# Patient Record
Sex: FEMALE | Race: WHITE | NOT HISPANIC OR LATINO | URBAN - METROPOLITAN AREA
[De-identification: names, ages, dates, MRNs, and addresses within clinical notes are randomized per-mention and may not be internally consistent; named-entity substitution may affect disease eponyms.]

---

## 2023-08-08 ENCOUNTER — HOSPITAL ENCOUNTER (OUTPATIENT)
Dept: DATA CONVERSION | Facility: HOSPITAL | Age: 43
Discharge: HOME | End: 2023-08-08

## 2023-08-08 DIAGNOSIS — R92.8 OTHER ABNORMAL AND INCONCLUSIVE FINDINGS ON DIAGNOSTIC IMAGING OF BREAST: ICD-10-CM

## 2024-01-24 PROCEDURE — 87624 HPV HI-RISK TYP POOLED RSLT: CPT

## 2024-01-24 PROCEDURE — 88175 CYTOPATH C/V AUTO FLUID REDO: CPT

## 2024-01-26 ENCOUNTER — LAB REQUISITION (OUTPATIENT)
Dept: LAB | Facility: HOSPITAL | Age: 44
End: 2024-01-26
Payer: COMMERCIAL

## 2024-01-26 DIAGNOSIS — Z11.51 ENCOUNTER FOR SCREENING FOR HUMAN PAPILLOMAVIRUS (HPV): ICD-10-CM

## 2024-01-26 DIAGNOSIS — Z01.419 ENCOUNTER FOR GYNECOLOGICAL EXAMINATION (GENERAL) (ROUTINE) WITHOUT ABNORMAL FINDINGS: ICD-10-CM

## 2024-02-08 ENCOUNTER — HOSPITAL ENCOUNTER (OUTPATIENT)
Dept: RADIOLOGY | Facility: CLINIC | Age: 44
Discharge: HOME | End: 2024-02-08
Payer: COMMERCIAL

## 2024-02-08 VITALS — BODY MASS INDEX: 20.49 KG/M2 | WEIGHT: 120 LBS | HEIGHT: 64 IN

## 2024-02-08 DIAGNOSIS — Z12.31 ENCOUNTER FOR SCREENING MAMMOGRAM FOR MALIGNANT NEOPLASM OF BREAST: ICD-10-CM

## 2024-02-08 PROCEDURE — 77067 SCR MAMMO BI INCL CAD: CPT

## 2024-07-17 ENCOUNTER — HOSPITAL ENCOUNTER (OUTPATIENT)
Dept: RADIOLOGY | Facility: CLINIC | Age: 44
Discharge: HOME | End: 2024-07-17
Payer: COMMERCIAL

## 2024-07-17 DIAGNOSIS — Z91.89 OTHER SPECIFIED PERSONAL RISK FACTORS, NOT ELSEWHERE CLASSIFIED: ICD-10-CM

## 2024-07-17 PROCEDURE — A9575 INJ GADOTERATE MEGLUMI 0.1ML: HCPCS | Performed by: STUDENT IN AN ORGANIZED HEALTH CARE EDUCATION/TRAINING PROGRAM

## 2024-07-17 PROCEDURE — 77049 MRI BREAST C-+ W/CAD BI: CPT | Performed by: RADIOLOGY

## 2024-07-17 PROCEDURE — 77049 MRI BREAST C-+ W/CAD BI: CPT

## 2024-07-17 PROCEDURE — 2550000001 HC RX 255 CONTRASTS: Performed by: STUDENT IN AN ORGANIZED HEALTH CARE EDUCATION/TRAINING PROGRAM

## 2024-07-17 RX ORDER — GADOTERATE MEGLUMINE 376.9 MG/ML
11 INJECTION INTRAVENOUS
Status: COMPLETED | OUTPATIENT
Start: 2024-07-17 | End: 2024-07-17

## 2024-07-19 PROBLEM — R92.8 ABNORMAL FINDING ON BREAST IMAGING: Status: ACTIVE | Noted: 2024-07-19

## 2024-07-19 NOTE — PROGRESS NOTES
[facility]  Arlene Bull female   1980 43 y.o.  [id number]      Chief Complaint  New patient, biopsy consultation.    History Of Present Illness  Arlene Bull is a pleasant 43 y.o.  female seen in the breast center for biopsy consultation. She denies breast surgery or biopsy. She has family history of breast cancer, see below.    BREAST IMAGIN2024 Full breast MRI, indicates BI-RADS Category 4. Suspicious multifocal left breast masses segmentally distributed in the superolateral quadrant. Recommend MRI directed ultrasound and biopsy. An ultrasound correlate is likely for the dominant mass. If the additional smaller masses are seen sonographically, they should be considered for ultrasound-guided biopsy as well to better characterize the extent of the abnormality. However if these additional masses are not seen, depending on the pathology results, additional biopsies under MRI guidance may be necessary.      Suspicious right breast subareolar non mass enhancement. Recommend MRI directed ultrasound and biopsy. If no correlate is found on ultrasound, recommend MRI guided biopsy.      Indeterminate right breast non-mass enhancement at posterior depth. Management will be determined based off of biopsy results of the previously described left breast masses and right breast non mass enhancement.      REPRODUCTIVE HISTORY: menarche age 14, , first birth age 29, did not breastfeed, no OCP's, premenopausal, LMP 1024, heterogeneously dense                                FAMILY CANCER HISTORY:   Mother: Breast cancer, 50's (s/p bilateral mastectomy)  Maternal Grandmother: Breast cancer, 50's (s/p bilateral mastectomy)      Review of Systems  Constitutional:  Negative for appetite change, fatigue, fever and unexpected weight change.   HENT:  Negative for ear pain, hearing loss, nosebleeds, sore throat and trouble swallowing.    Eyes:  Negative for discharge, itching and visual disturbance.    Breast: As stated in HPI.  Respiratory:  Negative for cough, chest tightness and shortness of breath.    Cardiovascular:  Negative for chest pain, palpitations and leg swelling.   Gastrointestinal:  Negative for abdominal pain, constipation, diarrhea and nausea.   Endocrine: Negative for cold intolerance and heat intolerance.   Genitourinary:  Negative for dysuria, frequency, hematuria, pelvic pain and vaginal bleeding.   Musculoskeletal:  Negative for arthralgias, back pain, gait problem, joint swelling and myalgias.   Skin:  Negative for color change and rash.   Allergic/Immunologic: Negative for environmental allergies and food allergies.   Neurological:  Negative for dizziness, tremors, speech difficulty, weakness, numbness and headaches.   Hematological:  Does not bruise/bleed easily.   Psychiatric/Behavioral:  Negative for agitation, dysphoric mood and sleep disturbance. The patient is not nervous/anxious.       Past Medical History  She has a past medical history of Pain in unspecified finger(s) (10/08/2014) and Unspecified sprain of unspecified thumb, initial encounter (10/08/2014).    Surgical History  She has no past surgical history on file.    Family History  Cancer-related family history includes Breast cancer (age of onset: 50) in her maternal grandmother and mother.     Social History  She reports that she has never smoked. She has never been exposed to tobacco smoke. She has never used smokeless tobacco. She reports current alcohol use of about 3.0 standard drinks of alcohol per week. She reports that she does not use drugs.    Allergies  Patient has no known allergies.    Medications  No current outpatient medications    Last Recorded Vitals  Vitals:    07/29/24 1025   BP: 125/80   Pulse: 88   Resp: 16   Temp: 36.6 °C (97.9 °F)       Physical Exam  Patient is alert and oriented x3 and in a relaxed and appropriate mood. Her gait is steady and hand grasps are equal. Sclera is clear. The breasts are  nearly symmetrical. The tissue is dense throughout especially superior lateral quadrants without palpable abnormalities, discrete nodules or masses. The skin and nipples appear normal. There is no cervical, supraclavicular or axillary lymphadenopathy. Heart rate and rhythm normal, S1 and S2 appreciated. The lungs are clear to auscultation bilaterally. Abdomen is soft and non-tender.        Relevant Results and Imaging  BI mammo bilateral screening tomosynthesis 02/08/2024    Narrative  Interpreted By:  Earlene Sandoval,  STUDY:  BI MAMMO BILATERAL SCREENING TOMOSYNTHESIS;  2/8/2024 4:37 pm    ACCESSION NUMBER(S):  [id number]    ORDERING CLINICIAN:  BRETT TEJEDA    INDICATION:  Screening.    Based on the Tyrer-Cuzick model for breast cancer risk assessment,  the patient's lifetime risk of breast cancer is 22.8%.    COMPARISON:  08/08/2023, 02/08/2023, 02/02/2023, 07/30/2021, 01/05/2021, 01/02/2021    FINDINGS:  2D and tomosynthesis images were reviewed at 1 mm slice thickness.    Density:  The breast tissue is heterogeneously dense, which may  obscure small masses.    There are benign calcifications seen bilaterally. No suspicious  masses or calcifications are identified.    Impression  No mammographic evidence of malignancy.    In individuals such as this patient with an elevated lifetime risk of  developing breast carcinoma greater than 20%, screening mammography  with supplemental MRI of the breasts may be beneficial at six-month  intervals.    BI-RADS CATEGORY:    BI-RADS Category:  2 Benign.  Recommendation:  Annual Screening.  Recommended Date:  1 Year.  Laterality:  Bilateral.    For any future breast imaging appointments, please call [phone]).      MACRO:  None      Signed by: Earlene Sandoval 2/9/2024 8:51 AM  Dictation workstation:   [id number]    Study Result    Narrative & Impression   Interpreted By:  Brock Hogue and Marshall Colin   STUDY:  BI MR BREAST BILATERAL WITH CONTRAST FULL PROTOCOL;   7/17/2024 11:58  am      ACCESSION NUMBER(S):  [id number]      ORDERING CLINICIAN:  BRETT TEJEDA      INDICATION:  High-risk exam. Dense breast tissue. Strong family history of breast  cancer.      COMPARISON:  Mammogram dated 02/08/2024 and 08/08/2023.      TECHNIQUE:  Using a dedicated breast coil, STIR axial and T1-weighted fat  saturation axial images of the breasts were obtained, the latter both  before and after intravenous administration of Gadolinium DTPA. On an  independent workstation, 3-D images were formulated using [employer]  including time enhancement curves, subtraction images and MIP images.      Intravenous contrast: 11 ML of Dotarem      FINDINGS:  Density: Heterogeneous fibroglandular tissue.      There is symmetric minimal bilateral background enhancement.      RIGHT BREAST:  There is focal heterogeneous non-mass enhancement in  the slightly superior subareolar region measuring up to 1.3 cm in  diameter (series 87791, image 80). There is also focal non-mass  enhancement within the slightly superior slightly medial breast at  posterior depth which measures 1 cm (series 86026, image 77.      No axillary or internal mammary lymphadenopathy is appreciated.      LEFT BREAST:  There are multifocal irregular and circumscribed masses  in the superolateral quadrant which span approximately 3.1 cm in AP  diameter. The anterior most aspect of the masses is seen on series  30580, image 74 and the posterior most aspect of the masses is seen  on series 59571, image 58.. Overall, the masses are distributed in a  segmental fashion.      No axillary or internal mammary lymphadenopathy is appreciated.      NON-BREAST FINDINGS:  None.      IMPRESSION:  Suspicious multifocal left breast masses segmentally distributed in  the superolateral quadrant. Recommend MRI directed ultrasound and  biopsy. An ultrasound correlate is likely for the dominant mass. If  the additional smaller masses are seen sonographically,  they should  be considered for ultrasound-guided biopsy as well to better  characterize the extent of the abnormality. However if these  additional masses are not seen, depending on the pathology results,  additional biopsies under MRI guidance may be necessary.      Suspicious right breast subareolar non mass enhancement. Recommend  MRI directed ultrasound and biopsy. If no correlate is found on  ultrasound, recommend MRI guided biopsy.      Indeterminate right breast non-mass enhancement at posterior depth.  Management will be determined based off of biopsy results of the  previously described left breast masses and right breast non mass  enhancement.      BI-RADS CATEGORY:  BI-RADS Category:  4 Suspicious.  Recommendation:  Surgical Consultation and Biopsy.  Recommended Date:  Immediate.  Laterality:  Bilateral.      For any future breast imaging appointments, please call [phone]  ([phone]).      I personally reviewed the images/study and I agree with the breast  imaging fellow, Jarrod Coreas D.O., findings as stated. This study  was interpreted at [place], [place].      MACRO:  Critical Finding:  Breast Imaging Abnormality. Notification was  initiated on 7/17/2024 at 3:50 pm by Dr. Jarrod Coreas.  (**-YCF-**)  Instructions:  Surgical Consultation and Imaging Guided Biopsy.      Signed by: Brock Hogue 7/17/2024 5:05 PM       I explained the results in depth, along with suggested explanation for follow up recommendations based on the testing results. BI-RADS Category 4    Visit Diagnosis  1. Abnormal finding on breast imaging            Assessment/Plan  Abnormal breast MRI, bilateral breast masses, no breast surgery or biopsy, family history of breast cancer, heterogeneously dense      Plan/Patient Discussion/Summary  Bilateral breast ultrasound with possible ultrasound guided core biopsy 7/30/24. If biopsy is indicated a breast radiology physician will perform  the biopsy. Results are usually available in about 7 business days. I will call patient with results and instruct on next steps and plan.     IMPORTANT INFORMATION REGARDING YOUR RESULTS    If you receive medical information from My [facility] Personal Health Record (online chart) your results will be released into your chart. This means you may view or see results of your biopsy or procedure before I contact you directly. If this occurs, please call the office and we will discuss your results over the phone.    You can see your health information, review clinical summaries from office visits & test results online when you follow your health with MY  Chart, a personal health record. To sign up go to [email or web address]. If you need assistance with signing up or trouble getting into your account call [facility] Patient Line 24/7 at [phone].    My office phone number is [phone]  if you need to get in touch with me or have additional questions or concerns. Thank you for choosing [facility] and trusting me as your healthcare provider. I look forward to seeing you again at your next office visit. I am honored to be a provider on your health care team and I remain dedicated to helping you achieve your health goals.       Tuyet Hernández, APRN-CNP

## 2024-07-29 ENCOUNTER — PROCEDURE VISIT (OUTPATIENT)
Dept: SURGICAL ONCOLOGY | Facility: HOSPITAL | Age: 44
End: 2024-07-29
Payer: COMMERCIAL

## 2024-07-29 VITALS
HEIGHT: 65 IN | DIASTOLIC BLOOD PRESSURE: 80 MMHG | BODY MASS INDEX: 19.36 KG/M2 | WEIGHT: 116.18 LBS | HEART RATE: 88 BPM | RESPIRATION RATE: 16 BRPM | TEMPERATURE: 97.9 F | SYSTOLIC BLOOD PRESSURE: 125 MMHG

## 2024-07-29 DIAGNOSIS — R92.8 ABNORMAL FINDING ON BREAST IMAGING: Primary | ICD-10-CM

## 2024-07-29 PROCEDURE — 99204 OFFICE O/P NEW MOD 45 MIN: CPT | Performed by: NURSE PRACTITIONER

## 2024-07-29 PROCEDURE — 99214 OFFICE O/P EST MOD 30 MIN: CPT | Performed by: NURSE PRACTITIONER

## 2024-07-29 ASSESSMENT — COLUMBIA-SUICIDE SEVERITY RATING SCALE - C-SSRS
2. HAVE YOU ACTUALLY HAD ANY THOUGHTS OF KILLING YOURSELF?: NO
1. IN THE PAST MONTH, HAVE YOU WISHED YOU WERE DEAD OR WISHED YOU COULD GO TO SLEEP AND NOT WAKE UP?: NO
6. HAVE YOU EVER DONE ANYTHING, STARTED TO DO ANYTHING, OR PREPARED TO DO ANYTHING TO END YOUR LIFE?: NO

## 2024-07-29 ASSESSMENT — ENCOUNTER SYMPTOMS
LOSS OF SENSATION IN FEET: 0
OCCASIONAL FEELINGS OF UNSTEADINESS: 0
DEPRESSION: 0

## 2024-07-29 ASSESSMENT — PATIENT HEALTH QUESTIONNAIRE - PHQ9
2. FEELING DOWN, DEPRESSED OR HOPELESS: NOT AT ALL
1. LITTLE INTEREST OR PLEASURE IN DOING THINGS: NOT AT ALL
SUM OF ALL RESPONSES TO PHQ9 QUESTIONS 1 AND 2: 0

## 2024-07-29 ASSESSMENT — PAIN SCALES - GENERAL: PAINLEVEL: 0-NO PAIN

## 2024-07-30 ENCOUNTER — HOSPITAL ENCOUNTER (OUTPATIENT)
Dept: RADIOLOGY | Facility: CLINIC | Age: 44
Discharge: HOME | End: 2024-07-30
Payer: COMMERCIAL

## 2024-07-30 DIAGNOSIS — R92.8 OTHER ABNORMAL AND INCONCLUSIVE FINDINGS ON DIAGNOSTIC IMAGING OF BREAST: ICD-10-CM

## 2024-07-30 DIAGNOSIS — R92.8 ABNORMAL FINDING ON BREAST IMAGING: ICD-10-CM

## 2024-07-30 DIAGNOSIS — R92.8 ABNORMAL FINDING ON BREAST IMAGING: Primary | ICD-10-CM

## 2024-07-30 PROCEDURE — 76981 USE PARENCHYMA: CPT | Mod: 50

## 2024-07-30 PROCEDURE — 77065 DX MAMMO INCL CAD UNI: CPT

## 2024-07-30 PROCEDURE — A4648 IMPLANTABLE TISSUE MARKER: HCPCS

## 2024-07-30 PROCEDURE — 2500000005 HC RX 250 GENERAL PHARMACY W/O HCPCS: Performed by: RADIOLOGY

## 2024-07-30 PROCEDURE — 76642 ULTRASOUND BREAST LIMITED: CPT | Mod: 50

## 2024-07-30 PROCEDURE — 19083 BX BREAST 1ST LESION US IMAG: CPT | Mod: LT

## 2024-07-30 PROCEDURE — C1819 TISSUE LOCALIZATION-EXCISION: HCPCS

## 2024-07-30 PROCEDURE — 19083 BX BREAST 1ST LESION US IMAG: CPT | Mod: LEFT SIDE | Performed by: RADIOLOGY

## 2024-07-30 PROCEDURE — 77065 DX MAMMO INCL CAD UNI: CPT | Mod: LEFT SIDE | Performed by: RADIOLOGY

## 2024-07-30 PROCEDURE — 2720000007 HC OR 272 NO HCPCS

## 2024-07-30 ASSESSMENT — PAIN SCALES - GENERAL
PAINLEVEL_OUTOF10: 0 - NO PAIN
PAINLEVEL_OUTOF10: 0 - NO PAIN

## 2024-07-30 NOTE — DISCHARGE INSTRUCTIONS
AFTER THE TEST  A steri-strip and bandage will be placed over the incision. You may shower after 24 hours. Remove bandage after 24 hours. Remove bandage after the shower. Leave the steri-strips in place to fall off on their own. If after 1 week the steri-strips are still on, you may remove them. Avoid swimming or soaking in tub for 3 days.     You may have mild discomfort at the test site. If needed, you may take Tylenol (Acetaminophen) for pain. Please avoid taking NSAIDs, Motrin, Advil, Aleve, or ibuprofen for 24 hours following the biopsy. After 24 hours you may resume NSAIDSs.     If you take aspirin, Plavix, Coumadin, Xarelto or Eliquis please tell us. If these medications were stopped by your provider, please ask them when to resume.     You may have some tenderness, bruising or slight bleeding at the site. Please apply ice packs to the site for 15 minutes on and 15 minutes off for a 2 hour minimum.     Most people can return to their usual routine after the procedure. Avoid Strenuous activity for 24 hours.     Sleep in a bra the night after your biopsy. Continue to do so for comfort.     Call your provider if you have any of the following symptoms :  Fever  Increased pain  Increased bleeding  Redness  Increased swelling  Yellowish drainage  Your provider will get the biopsy results within 5 - 7 days. Call your provider with any questions.     Patient education brochure and pain/comfort measures have been reviewed.   Phone number provided to contact Breast Center if problems arise.     Patient verbalized understanding of home going instructions.  Patient left at 1040.

## 2024-07-30 NOTE — Clinical Note
Procedural steps explained and patient given opportunity to verbalize concerns and seek clarification.  Post procedure self-care and potential for bruising , hematoma, and pain reviewed.  Patient verbalizes understanding.   Patient offered aromatherapy, warm blankets and music. Guided imagery, touch and relaxation breathing to be used throughout the procedure.  [id number]

## 2024-07-30 NOTE — Clinical Note
Done.  Pressure held x 10 minutes, incision dry, steri strips intact and compression dressing applied. Ice pack applied.

## 2024-08-02 ENCOUNTER — TELEPHONE (OUTPATIENT)
Dept: SURGICAL ONCOLOGY | Facility: HOSPITAL | Age: 44
End: 2024-08-02
Payer: COMMERCIAL

## 2024-08-02 LAB
LABORATORY COMMENT REPORT: NORMAL
PATH REPORT.FINAL DX SPEC: NORMAL
PATH REPORT.GROSS SPEC: NORMAL
PATH REPORT.RELEVANT HX SPEC: NORMAL
PATH REPORT.TOTAL CANCER: NORMAL

## 2024-08-02 NOTE — TELEPHONE ENCOUNTER
"Result Communication    Spoke with Arlene Bull regarding breast biopsy results, benign. Awaiting concordance report, patient aware. Office will call to schedule a high risk evaluation in 6 months.    Resulted Orders   Surgical Pathology Exam   Result Value Ref Range    Case Report       Surgical Pathology                                Case: [id number]                                  Authorizing Provider:  BONI Cardoza    Collected:           07/30/2024 1010              Ordering Location:     [facility]       Received:            07/30/2024 1129                                     Center                                                                       Pathologist:           Esequiel Dominguez MD                                                           Specimen:    BREAST CORE BIOPSY LEFT, LEFT BREAST 1:00 4 CM FN                                          FINAL DIAGNOSIS       A. Breast, left, 1:00, 4 cm from the nipple, core biopsy:  --Breast parenchyma with fibroadenoma, columnar cell change, stromal fibrosis, and pseudoangiomatous stromal hyperplasia    This case has been reviewed at the [facility] breast pathology consensus conference via Zoom on 8/2/24.  Attending: CARA Laboy DO, A. Harbhajanka, MD, LUIS FERNANDO Kenny MD              By the signature on this report, the individual or group listed as making the Final Interpretation/Diagnosis certifies that they have reviewed this case.       Clinical History       LEFT BREAST 1:00 4 CM FN      Gross Description       Received in formalin, labeled with the patient´s name and hospital number and \"left breast 1:00, 4 cm FN\", are multiple irregular/cylindrical segments of yellow-white fatty soft tissue aggregating to 2.0 x 0.6 x 0.3 cm.  The specimen is submitted in toto in two cassettes.  [facility]    NOTE:  Ischemia time: not provided.  This specimen was placed into formalin at: 7/30/24 10:10 am.         1:28 PM    "

## 2024-10-29 ENCOUNTER — APPOINTMENT (OUTPATIENT)
Dept: PRIMARY CARE | Facility: CLINIC | Age: 44
End: 2024-10-29
Payer: COMMERCIAL

## 2024-11-01 ENCOUNTER — APPOINTMENT (OUTPATIENT)
Dept: PRIMARY CARE | Facility: CLINIC | Age: 44
End: 2024-11-01
Payer: COMMERCIAL

## 2024-11-01 VITALS
SYSTOLIC BLOOD PRESSURE: 130 MMHG | OXYGEN SATURATION: 99 % | HEART RATE: 79 BPM | BODY MASS INDEX: 20.16 KG/M2 | DIASTOLIC BLOOD PRESSURE: 88 MMHG | HEIGHT: 65 IN | WEIGHT: 121 LBS

## 2024-11-01 DIAGNOSIS — Z00.00 ANNUAL PHYSICAL EXAM: Primary | ICD-10-CM

## 2024-11-01 DIAGNOSIS — M54.42 ACUTE MIDLINE LOW BACK PAIN WITH LEFT-SIDED SCIATICA: ICD-10-CM

## 2024-11-01 PROCEDURE — 3008F BODY MASS INDEX DOCD: CPT

## 2024-11-01 PROCEDURE — 99386 PREV VISIT NEW AGE 40-64: CPT

## 2024-11-01 RX ORDER — TIZANIDINE 2 MG/1
2 TABLET ORAL EVERY 6 HOURS PRN
Qty: 30 TABLET | Refills: 0 | Status: SHIPPED | OUTPATIENT
Start: 2024-11-01 | End: 2024-11-11

## 2024-11-01 RX ORDER — METHYLPREDNISOLONE 4 MG/1
TABLET ORAL
Qty: 21 TABLET | Refills: 0 | Status: SHIPPED | OUTPATIENT
Start: 2024-11-01 | End: 2024-11-08

## 2024-11-01 ASSESSMENT — PAIN SCALES - GENERAL: PAINLEVEL_OUTOF10: 8

## 2024-11-03 ASSESSMENT — VISUAL ACUITY: OU: 1

## 2024-12-09 ENCOUNTER — OFFICE VISIT (OUTPATIENT)
Dept: PAIN MEDICINE | Facility: CLINIC | Age: 44
End: 2024-12-09
Payer: COMMERCIAL

## 2024-12-09 VITALS
HEIGHT: 65 IN | DIASTOLIC BLOOD PRESSURE: 82 MMHG | WEIGHT: 121 LBS | HEART RATE: 86 BPM | OXYGEN SATURATION: 97 % | BODY MASS INDEX: 20.16 KG/M2 | SYSTOLIC BLOOD PRESSURE: 124 MMHG

## 2024-12-09 DIAGNOSIS — M54.42 ACUTE MIDLINE LOW BACK PAIN WITH LEFT-SIDED SCIATICA: ICD-10-CM

## 2024-12-09 PROCEDURE — 99204 OFFICE O/P NEW MOD 45 MIN: CPT | Performed by: ANESTHESIOLOGY

## 2024-12-09 PROCEDURE — 3008F BODY MASS INDEX DOCD: CPT | Performed by: ANESTHESIOLOGY

## 2024-12-09 PROCEDURE — 1036F TOBACCO NON-USER: CPT | Performed by: ANESTHESIOLOGY

## 2024-12-09 PROCEDURE — 99214 OFFICE O/P EST MOD 30 MIN: CPT | Performed by: ANESTHESIOLOGY

## 2024-12-09 ASSESSMENT — ENCOUNTER SYMPTOMS
ENDOCRINE NEGATIVE: 1
CARDIOVASCULAR NEGATIVE: 1
BACK PAIN: 1
GASTROINTESTINAL NEGATIVE: 1
EYES NEGATIVE: 1
PSYCHIATRIC NEGATIVE: 1
NEUROLOGICAL NEGATIVE: 1
CONSTITUTIONAL NEGATIVE: 1
RESPIRATORY NEGATIVE: 1
HEMATOLOGIC/LYMPHATIC NEGATIVE: 1
ALLERGIC/IMMUNOLOGIC NEGATIVE: 1

## 2024-12-09 ASSESSMENT — PAIN - FUNCTIONAL ASSESSMENT: PAIN_FUNCTIONAL_ASSESSMENT: 0-10

## 2024-12-09 ASSESSMENT — PAIN SCALES - GENERAL
PAINLEVEL_OUTOF10: 5 - MODERATE PAIN
PAINLEVEL_OUTOF10: 5

## 2024-12-09 ASSESSMENT — LIFESTYLE VARIABLES: TOTAL SCORE: 1

## 2024-12-09 ASSESSMENT — PATIENT HEALTH QUESTIONNAIRE - PHQ9
1. LITTLE INTEREST OR PLEASURE IN DOING THINGS: NOT AT ALL
SUM OF ALL RESPONSES TO PHQ9 QUESTIONS 1 AND 2: 0
2. FEELING DOWN, DEPRESSED OR HOPELESS: NOT AT ALL

## 2024-12-09 ASSESSMENT — PAIN DESCRIPTION - DESCRIPTORS: DESCRIPTORS: ACHING

## 2024-12-09 NOTE — PROGRESS NOTES
"Subjective   Patient ID: Arlene Bull is a 44 y.o. female who presents for Back Pain.  Back Pain    Patient here today for a new patient evaluation of her low back pain. She reports that the pain started about 3 years ago when she woke up with severe back pain and she went to the urgent care and had IM steroid/toradol and it went away.  She has had a tooth ache\" feeling in her back since.  She then would have flares in pain every couple months.  But 1-2 times per month she would have severe pain, when she can get out of bed.  The pain is located low in the back.  She will have some radiation on  the left buttock.  She finds that the pain is constant when it comes.  Laying down is the best.  Sitting and standing in one spot are the worse for her.  Moving and walking to help.  She reports no weakness in her legs, numbness, or tingling.  She has not had any PT.  3 years ago she went to a chiro and had small improvement.  She has not had any imaging since the initial incident 3 years ago.  She is rating her pain today as a 5/10.      Review of Systems   Constitutional: Negative.    HENT: Negative.     Eyes: Negative.    Respiratory: Negative.     Cardiovascular: Negative.    Gastrointestinal: Negative.    Endocrine: Negative.    Genitourinary: Negative.    Musculoskeletal:  Positive for back pain.   Skin: Negative.    Allergic/Immunologic: Negative.    Neurological: Negative.    Hematological: Negative.    Psychiatric/Behavioral: Negative.         Objective   Physical Exam  Vitals and nursing note reviewed.   Constitutional:       Appearance: Normal appearance.   HENT:      Head: Normocephalic and atraumatic.      Right Ear: Ear canal and external ear normal.      Left Ear: Ear canal and external ear normal.      Nose: Nose normal.      Mouth/Throat:      Mouth: Mucous membranes are moist.      Pharynx: Oropharynx is clear.   Eyes:      Conjunctiva/sclera: Conjunctivae normal.      Pupils: Pupils are equal, round, and " reactive to light.   Cardiovascular:      Rate and Rhythm: Normal rate.   Pulmonary:      Effort: Pulmonary effort is normal. No respiratory distress.   Musculoskeletal:      Cervical back: Normal range of motion and neck supple.      Lumbar back: Tenderness present. No spasms. Normal range of motion. Negative right straight leg raise test and negative left straight leg raise test. Scoliosis present.        Back:       Comments: _Si Joint Provocation (-) Bilaterally  Increase lumbar pain with flexion.  No change in pain with extension or rotation.     Skin:     General: Skin is warm and dry.   Neurological:      Mental Status: She is alert and oriented to person, place, and time.      Sensory: Sensation is intact.      Motor: Motor function is intact.      Coordination: Coordination is intact.      Gait: Gait is intact.   Psychiatric:         Mood and Affect: Mood normal.         Thought Content: Thought content normal.         Assessment/Plan   Problem List Items Addressed This Visit    None  Visit Diagnoses         Codes    Acute midline low back pain with left-sided sciatica     M54.42    Relevant Orders    Referral to Physical Therapy             I nice discussion with the patient today our plan will be as follows.    Radiology: All imagine was reviewed.     Physically:  Patient to start PT 2 times per week for 6 weeks.     Psychologically:  no issues    Medication: Cotninue OTC Ibuprofen as needed.     Duration:  3 years    Intervention:  Patient to maximize rehab therapy at this time.        Please note that this report has been produced using speech recognition software. It may contain errors related to grammar, punctuation or spelling. Electronically signed, but not reviewed.       Danilo Gilmore MD 12/09/24 2:16 PM

## 2024-12-11 ENCOUNTER — TELEPHONE (OUTPATIENT)
Dept: SURGERY | Facility: HOSPITAL | Age: 44
End: 2024-12-11
Payer: COMMERCIAL

## 2024-12-11 DIAGNOSIS — R92.8 ABNORMAL MRI, BREAST: Primary | ICD-10-CM

## 2024-12-11 NOTE — TELEPHONE ENCOUNTER
Left voicemail for Arlene that recommendation for follow up after the breast biopsy is a 6 month breast MRI January of 2025 and order placed. She is to call the office with any questions or concerns.

## 2024-12-24 NOTE — PROGRESS NOTES
Physical Therapy Evaluation/Treatment    Patient Name: Arlene Bull  MRN: [id number]  Encounter Date: 1/13/2025  Time Calculation  Start Time: 1432  Stop Time: 1522  Time Calculation (min): 50 min    Visit Number:  1/10 (including evaluation)  Planned total visits: 10  Visit Authorized/insurance considerations:  01/10/2025: NO AUTH,4000 DED,  70/30 COVERAGE, 60V PT, 6500 OOP, [employer]   12/9/202  Progress Report due visit #10    Current Problem:  Problem List Items Addressed This Visit    None  Visit Diagnoses         Codes    Acute midline low back pain with left-sided sciatica    -  Primary M54.42    Relevant Orders    Follow Up In Physical Therapy          Subjective:  Subjective     SUBJECTIVE:  Main complaint:  was doing beach body work out which involved 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# about 3 years ago.  Pain became more consistent beginning of 2024 and is progressively worsening.    Onset Date:  Rx:  12/09/2024;    Date of Injury:  NA, began approx 3 years ago    Patient reported hx of injury:   NA    Previous Medical Treatment:    Dose pack a few months ago.   Recommended pain mgt.  Plan is for an MRI with PT first for insurance purposes.    Relevant PMH:  unremarkable    Red flags:  None    Imaging:  Other: nothing current    Previous Therapy Treatments:    N/A    Pt stated Goal:    Able to work out with free weights (home workout)  Pain free    General:  General  Reason for Referral: Back across and  ocassional/rarely down the left leg  General Comment: Pt was told by urgent care the images showed she had scoliosis and had injections    Precautions:  Precautions  Precautions Comment: none    Pain:  Pain Assessment: 0-10  0-10 (Numeric) Pain Score: 7  Pain Type: Chronic pain  Pain Location: Back  Pain Orientation: Lower  Pain Descriptors: Aching  Pain Frequency: Constant/continuous  Pain Onset: Ongoing    Home Living:  Home Living Comment: yes    Home type: House  Stairs: Yes  Lives with: Family    Vocation:    Full time  employment   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, 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#, very physical job    Prior Function Per Pt/Caregiver Report:  Level of [place]: Independent with ADLs and functional transfers, Independent with homemaking with ambulation    OBJECTIVE:    Posture:  Posture Comment: forward head, rounded back , probable leg length discrepancy,, depressed left shoulder and scapula, appears to have mild scoliosis with right rib hump    Pt appears to present with mild scoliosis  Pt was + for posterior rotation on left    ROM and Strength:    Lumbar:      AROM:  WFL    Lumbar STRENGTH    Flexion fair    Extension fair    //////////////////////////////////////////////////////     STRENGTH     R L   Rotation  fair fair   Sidebend  fair fair     Significant core/transverse abdominal and trunk weakness    Hip:    See below       AROM STRENGTH   Hip R L R L   Hip Flexion SLR 70 70 4+/5 4+/5   Hip Abduction WFL WFL 5/5 5/5   Hip Adduction WFL WFL 5/5 5/5   Internal Rotation WFL WFL     External Rotation WFL WFL       Knee:    AROM:  WFL     STRENGTH   Knee R L   Knee Flexion 5/5 5/5   Knee Extension 5/5 5/5     Ankle:      Strength:  WFL and AROM:  WFL    Flexibility:       R  L   Pectoralis tight tight   Piriformis tight tight   Hamstring tight tight        Special Tests:     Lumbar  Repeated Flexion in Standing negative  after 10 times   Repeated Extension in Standing negative  after 10 times     Neural   Right Left   SLR negative negative       Palpation:  Palpation Comment: left mid to proximal piriforms TTP    Gait:  Gait Comment: WNL    Outcome Measures:  Other Measures  Oswestry Disablity Index (LYDIA): 12     Assessment  PT Assessment Results: Decreased strength, Decreased range of motion, Pain  Rehab Prognosis: Good  Assessment Comment: Pt is an excellent candidate for PT to improve her core strength and stretching program    Pt is a 44 y.o. female who presents with worsening low back pain and piriformis pain.   Pt would benefit from skilled physical therapy to improve flexibility, ROM, strength to reduce pain and improve the patient's current level of functioning including routine exercise program.  Pt would also benefit from proper body mechanics and ergonomic training to better manage the patient's symptoms and reduce exacerbation.      Based on the history including personal factors and/or comorbidities, examination of body systems including body structures and function, activity limitations, and/or participation restrictions, as well as clinical presentation, patient meets criteria for low complexity evaluation.    Plan  Treatment/Interventions: Aquatic therapy, Education/ Instruction, Electrical stimulation, Manual therapy, Neuromuscular re-education, Mechanical traction, Taping techniques, Therapeutic activities, Therapeutic exercises, Ultrasound  PT Plan: Skilled PT  PT Frequency: 2 times per week  Duration: 12 week certification period  Onset Date: 12/09/24  Certification Period Start Date: 01/13/25  Certification Period End Date: 04/13/25  Number of Treatments Authorized: 10  Rehab Potential: Good  Plan of Care Agreement: Patient    OP EDUCATION:  Outpatient Education  Individual(s) Educated: Patient  Education Provided: Anatomy, Body Mechanics, Home Exercise Program, Physiology, POC, Posture    Goals:  Active       PT Problem - LBP       PT Goal 1 - STG       Start:  01/13/25    Expected End:  02/27/25            PT Goal 2 LT FUNCTIONAL GOALS       Start:  01/13/25    Expected End:  04/13/25       1.  Improve LE AROM SLR to WFL  2.  Improve bilateral hamstring length to WFL  3.  Improve trunk strength to Good- or better  4.  Pain:  1-2  5.  Functional Outcome Measure:  4              Patient Stated Goal 1       Start:  01/13/25    Expected End:  04/13/25       Able to work out with free weights (home workout)  Pain free             Treatments this date:       Access Code: [email or web address]  URL:  [email or web address]  Date: 01/13/2025  Prepared by: Emmy Siddiqi    Exercises  - Supine Figure 4 Piriformis Stretch  - 1-2 x daily - 5-7 x weekly - 1 sets - 3 reps - 30 sec hold  - Seated Hamstring Stretch  - 1-2 x daily - 5-7 x weekly - 1 sets - 3 reps - 30 sec hold  - Seated Table Hamstring Stretch  - 1-2 x daily - 5-7 x weekly - 1 sets - 3 reps - 30 sec hold    Billed Treatment Times:  Therapeutic Exercise 8 min    Therapeutic modalities this date:       Billed Treatment Times:  Therapeutic Activities:    OP PT EDUCATION:    Anatomy, rationale for heel lift and leg length discrepancy, body mechanics at work and activities at home  may be contributing to pt's back pain, discussed scoliosis and related lumbar issues, rationale for PT POC    Pt given written exercise sheet and information on heel lift    Billed Treatment Times:  Therapeutic Activity 10 min    Plan for next visit:  reassess PSIS after pt perform stretches over the next few days, joint mobilization as needed.  Initiate low level and progress to higher level DLS exercises, US, manual and stretches for piriformis, trunk and stretches for hamstring

## 2025-01-13 ENCOUNTER — EVALUATION (OUTPATIENT)
Dept: PHYSICAL THERAPY | Facility: CLINIC | Age: 45
End: 2025-01-13
Payer: COMMERCIAL

## 2025-01-13 DIAGNOSIS — M54.42 ACUTE MIDLINE LOW BACK PAIN WITH LEFT-SIDED SCIATICA: Primary | ICD-10-CM

## 2025-01-13 PROCEDURE — 97161 PT EVAL LOW COMPLEX 20 MIN: CPT | Mod: GP | Performed by: PHYSICAL THERAPIST

## 2025-01-13 PROCEDURE — 97530 THERAPEUTIC ACTIVITIES: CPT | Mod: GP | Performed by: PHYSICAL THERAPIST

## 2025-01-13 ASSESSMENT — PAIN - FUNCTIONAL ASSESSMENT: PAIN_FUNCTIONAL_ASSESSMENT: 0-10

## 2025-01-13 ASSESSMENT — PAIN SCALES - GENERAL: PAINLEVEL_OUTOF10: 7

## 2025-01-13 ASSESSMENT — PAIN DESCRIPTION - DESCRIPTORS: DESCRIPTORS: ACHING

## 2025-01-15 ENCOUNTER — TREATMENT (OUTPATIENT)
Dept: PHYSICAL THERAPY | Facility: CLINIC | Age: 45
End: 2025-01-15
Payer: COMMERCIAL

## 2025-01-15 ENCOUNTER — HOSPITAL ENCOUNTER (OUTPATIENT)
Dept: RADIOLOGY | Facility: CLINIC | Age: 45
Discharge: HOME | End: 2025-01-15
Payer: COMMERCIAL

## 2025-01-15 DIAGNOSIS — R92.8 ABNORMAL MRI, BREAST: ICD-10-CM

## 2025-01-15 DIAGNOSIS — M54.42 ACUTE MIDLINE LOW BACK PAIN WITH LEFT-SIDED SCIATICA: ICD-10-CM

## 2025-01-15 PROCEDURE — 97110 THERAPEUTIC EXERCISES: CPT | Mod: GP,CQ

## 2025-01-15 PROCEDURE — 97140 MANUAL THERAPY 1/> REGIONS: CPT | Mod: GP,CQ

## 2025-01-15 PROCEDURE — A9575 INJ GADOTERATE MEGLUMI 0.1ML: HCPCS | Performed by: NURSE PRACTITIONER

## 2025-01-15 PROCEDURE — 2550000001 HC RX 255 CONTRASTS: Performed by: NURSE PRACTITIONER

## 2025-01-15 PROCEDURE — 77049 MRI BREAST C-+ W/CAD BI: CPT

## 2025-01-15 PROCEDURE — 77049 MRI BREAST C-+ W/CAD BI: CPT | Performed by: STUDENT IN AN ORGANIZED HEALTH CARE EDUCATION/TRAINING PROGRAM

## 2025-01-15 RX ORDER — GADOTERATE MEGLUMINE 376.9 MG/ML
11 INJECTION INTRAVENOUS
Status: COMPLETED | OUTPATIENT
Start: 2025-01-15 | End: 2025-01-15

## 2025-01-15 RX ADMIN — GADOTERATE MEGLUMINE 11 ML: 376.9 INJECTION INTRAVENOUS at 08:57

## 2025-01-15 ASSESSMENT — PAIN SCALES - GENERAL: PAINLEVEL_OUTOF10: 5 - MODERATE PAIN

## 2025-01-15 ASSESSMENT — PAIN DESCRIPTION - DESCRIPTORS: DESCRIPTORS: ACHING

## 2025-01-15 ASSESSMENT — PAIN - FUNCTIONAL ASSESSMENT: PAIN_FUNCTIONAL_ASSESSMENT: 0-10

## 2025-01-15 NOTE — PROGRESS NOTES
"    Physical Therapy Treatment    Patient Name: Arlene Bull  MRN: [id number]  Encounter date:  1/15/2025  Time Calculation  Start Time: 1430  Stop Time: 1516  Time Calculation (min): 46 min     PT Therapeutic Procedures Time Entry  Manual Therapy Time Entry: 16  Therapeutic Exercise Time Entry: 24    Visit Number:  2 (including evaluation)  Planned total visits: 10  Visit Authorized/insurance coverage:  01/10/2025: NO AUTH,4000 DED, 70/30 COVERAGE, 60V PT, 6500 OOP, [employer]   Progress Report due visit #10      Current Problem  Problem List Items Addressed This Visit    None  Visit Diagnoses         Codes    Acute midline low back pain with left-sided sciatica     M54.42               Precautions  Precautions  Precautions Comment: none      Pain  Pain Assessment: 0-10  0-10 (Numeric) Pain Score: 5 - Moderate pain  Pain Type: Chronic pain  Pain Location: Back  Pain Orientation: Lower  Pain Descriptors: Aching  Pain Frequency: Constant/continuous  Pain Onset: Ongoing    Subjective  General  Patient reports that she is okay to walk but sitting for any length of time increases her pain to higher levels.  States she is kelvin she does not have to sit often, especially at work.      Objective  Tightness low lumbar, quadratus lumborum, L piriformis    Treatment:  Therapeutic Exercise  Therapeutic Exercise Performed: Yes  H/L Ab bracing 5\" x15  H/L Hip Adduction with rainbow ball 5\" x20  H/L Hip Abduction with OTB   LTR x20  Doorway Stretch 3 x15\" DNP    Supine piriformis figure 4 stretch 2 x30\"  Seated HS stretch 2 x30\"   MoFlex 2 x30\"       Current HEP:  H/L Ab bracing 5\" x15  H/L Hip Adduction with rainbow ball 5\" x20  H/L Hip Abduction with OTB   LTR x20  Doorway Stretch 3 x15\"    Supine piriformis figure 4 stretch 2 x30\"  Seated HS stretch 2 x30\"   MoFlex 2 x30\"   Has patient been compliant with HEP?  Yes    Billed Treatment Times:  Therapeutic Exercise 24 min    Manual Therapy  Manual Therapy Performed: Yes  Manual:  " "  MFR/STM across low lumbar into quadratus lumborem   Piriformis TP  Billed Treatment Times:  Manual Therapy  16 min        OP EDUCATION:   Patent given instruction sheets for HEP with good demonstration.     Assessment:   Patient tolerated additional DLS exercises well with good form.  She reports compliance to HEP and notes pain has improved since evaluation.  She states LBP remains the same but she feel much looser at end of session..  Areas of improvements:   AROM and greater lumbar flexibility  Limitations/deficits:   pain with sitting    Pain end of session:  Same, but \"feels really nice and loose'\"    Plan:     Continue with current POC/no changes    Assessment of current progress against goals:  Symptomatic relief with treatment    Goals:  Active       PT Problem - LBP       PT Goal 1 - STG       Start:  01/13/25    Expected End:  02/27/25            PT Goal 2 LT FUNCTIONAL GOALS       Start:  01/13/25    Expected End:  04/13/25       1.  Improve LE AROM SLR to WFL  2.  Improve bilateral hamstring length to WFL  3.  Improve trunk strength to Good- or better  4.  Pain:  1-2  5.  Functional Outcome Measure:  4              Patient Stated Goal 1       Start:  01/13/25    Expected End:  04/13/25       Able to work out with free weights (home workout)  Pain free                      "

## 2025-01-16 ENCOUNTER — TELEPHONE (OUTPATIENT)
Dept: SURGICAL ONCOLOGY | Facility: HOSPITAL | Age: 45
End: 2025-01-16
Payer: COMMERCIAL

## 2025-01-16 DIAGNOSIS — R92.8 ABNORMAL MRI, BREAST: Primary | ICD-10-CM

## 2025-01-16 NOTE — TELEPHONE ENCOUNTER
Result Communication    Spoke Arlene Bull regarding MRI results. Order placed for 6 month follow up MRI and will keep scheduled mammogram and exam for 2/2025.      Resulted Orders   MR breast bilateral w contrast full protocol    Narrative    Interpreted By:  Whitney Siegel,   STUDY:  BI MR BREAST BILATERAL WITH CONTRAST FULL PROTOCOL;  1/15/2025 9:08 am      ACCESSION NUMBER(S):  [id number]      ORDERING CLINICIAN:  KRISS SANDERS      INDICATION:  Short-term follow-up following benign left breast biopsy. The patient  was noted to have bilateral indeterminate findings on the MRI dated  07/17/2024, however following benign biopsy of the left breast, the  remaining findings were felt to be of probably benign etiology.  Family history of breast cancer. Elevated lifetime risk through      ,R92.8 Other abnormal and inconclusive findings on diagnostic imaging  of breast      COMPARISON:  MRI 07/17/2024. Mammogram 02/08/2024.      TECHNIQUE:  Using a dedicated breast coil, STIR axial and T1-weighted fat  saturation axial images of the breasts were obtained, the latter both  before and after intravenous administration of Gadolinium DTPA. On an  independent workstation, 3-D images were formulated using [employer]  including time enhancement curves, subtraction images and MIP images.      Intravenous contrast: 11 ML of Dotarem      FINDINGS:  Density: Heterogeneous fibroglandular tissue.      There is symmetric mild bilateral background enhancement.      RIGHT BREAST:  There is stable appearance of the previously described  focal heterogenous non-mass enhancement in the slightly superior  subareolar breast (subtraction image 86/160). Additionally there is  stable focal non-mass enhancement in the deep central breast  measuring 1.0 cm (subtraction image 86/160). No new suspicious mass  or nonmass enhancement is identified.      No axillary or internal mammary lymphadenopathy is appreciated.      LEFT BREAST:  There is also  similar appearance of the previously  noted multifocal clumped non mass enhancement in the upper outer  breast which spans approximately 3.2 cm (subtraction image  58-67/160). Signal void is seen adjacent to the area of clumped non  mass enhancement, corresponding to the site of benign biopsy. No new  suspicious mass or nonmass enhancement is identified.      No axillary or internal mammary lymphadenopathy is appreciated.      NON-BREAST FINDINGS:  None.        Impression    Stable probably benign areas of non mass enhancement bilaterally.  Continued short-term MRI follow-up in 6 months is recommended to  ensure stability.      BI-RADS CATEGORY:  BI-RADS Category:  3 Probably Benign.  Recommendation:  Short Interval Follow-up.  Recommended Date:  6 Months.  Laterality:  Bilateral.      For any future breast imaging appointments, please call [phone]).          MACRO:  None      Signed by: Whitney Siegel 1/16/2025 11:35 AM  Dictation workstation:   [id number]       3:47 PM

## 2025-01-22 ENCOUNTER — APPOINTMENT (OUTPATIENT)
Dept: PHYSICAL THERAPY | Facility: CLINIC | Age: 45
End: 2025-01-22
Payer: COMMERCIAL

## 2025-01-24 NOTE — PROGRESS NOTES
"    Physical Therapy Treatment    Patient Name: Arlene Bull  MRN: [id number]  Encounter date:  1/27/2025             Visit Number:  3 (including evaluation)  Planned total visits: 10  Visit Authorized/insurance coverage:  01/10/2025: NO AUTH,4000 DED, 70/30 COVERAGE, 60V PT, 6500 OOP, C   Progress Report due visit #10    Current Problem  Problem List Items Addressed This Visit    None       Precautions         Pain       Subjective  General            Objective  ***    Treatment:  {PT Treatments:61505}  H/L Ab bracing 5\" x15  H/L Hip Adduction with rainbow ball 5\" x20  H/L Hip Abduction with OTB   LTR x20  Doorway Stretch 3 x15\" DNP     Supine piriformis figure 4 stretch 2 x30\"  Seated HS stretch 2 x30\"   MoFlex 2 x30\"         Current HEP:  H/L Ab bracing 5\" x15  H/L Hip Adduction with rainbow ball 5\" x20  H/L Hip Abduction with OTB   LTR x20  Doorway Stretch 3 x15\"     Supine piriformis figure 4 stretch 2 x30\"  Seated HS stretch 2 x30\"   MoFlex 2 x30\"   Has patient been compliant with HEP?  Yes     Has patient been compliant with HEP?  {YES/NO:70308}    Billed Treatment Times:  {Treatment times:01936}    {PT Treatments:59480}  Manual:    Manual Therapy  Manual Therapy Performed: Yes  MFR/STM across low lumbar into quadratus lumborem   Piriformis TP  Billed Treatment Times:  {Treatment times:16887}      {PT Treatments:05055}  Balance/NMRE:     ***  Billed Treatment Times:  {Treatment times:35559}    {PT Treatments:50974}  Therapeutic Activity:  ***  Billed Treatment Times:  {Treatment times:04962}    OP EDUCATION:       Assessment:     Areas of improvements:  {basassessmentimprovements:92896}  Limitations/deficits:  {basassessmentlimitations/deficits:95861}    Pain end of session:  ***    Plan:     {BASPLAN:33567}    Assessment of current progress against goals:  {BASPTNOTEGOALASSESSMENT:36233}    Goals:  Active       PT Problem - LBP       PT Goal 1 - STG       Start:  01/13/25    Expected End:  02/27/25            " PT Goal 2 LT FUNCTIONAL GOALS       Start:  01/13/25    Expected End:  04/13/25       1.  Improve LE AROM SLR to WFL  2.  Improve bilateral hamstring length to WFL  3.  Improve trunk strength to Good- or better  4.  Pain:  1-2  5.  Functional Outcome Measure:  4              Patient Stated Goal 1       Start:  01/13/25    Expected End:  04/13/25       Able to work out with free weights (home workout)  Pain free

## 2025-01-27 ENCOUNTER — APPOINTMENT (OUTPATIENT)
Dept: PHYSICAL THERAPY | Facility: CLINIC | Age: 45
End: 2025-01-27
Payer: COMMERCIAL

## 2025-01-28 NOTE — PROGRESS NOTES
"    Physical Therapy Treatment    Patient Name: Arlene Bull  MRN: [id number]  Encounter date:  1/31/2025             Visit Number:  3 (including evaluation)  Planned total visits: 10  Visit Authorized/insurance coverage:  01/10/2025: NO AUTH,4000 DED, 70/30 COVERAGE, 60V PT, 6500 OOP, C   Progress Report due visit #10    Current Problem  Problem List Items Addressed This Visit    None     Precautions         Pain       Subjective  General            Objective  ***    Treatment:  {PT Treatments:92637}  H/L Ab bracing 5\" x15  H/L Hip Adduction with rainbow ball 5\" x20  H/L Hip Abduction with OTB   LTR x20  Doorway Stretch 3 x15\" DNP     Supine piriformis figure 4 stretch 2 x30\"  Seated HS stretch 2 x30\"   MoFlex 2 x30\"         Current HEP:  H/L Ab bracing 5\" x15  H/L Hip Adduction with rainbow ball 5\" x20  H/L Hip Abduction with OTB   LTR x20  Doorway Stretch 3 x15\"     Supine piriformis figure 4 stretch 2 x30\"  Seated HS stretch 2 x30\"   MoFlex 2 x30\"     Current HEP:  ***    Has patient been compliant with HEP?  {YES/NO:98082}    Billed Treatment Times:  {Treatment times:26591}    {PT Treatments:69988}  Manual:    MFR/STM across low lumbar into quadratus lumborem   Piriformis TP  Billed Treatment Times:  {Treatment times:41027}      {PT Treatments:64815}  Balance/NMRE:     ***  Billed Treatment Times:  {Treatment times:36860}    {PT Treatments:80528}  Therapeutic Activity:  ***  Billed Treatment Times:  {Treatment times:33235}    OP EDUCATION:       Assessment:     Areas of improvements:  {basassessmentimprovements:23055}  Limitations/deficits:  {basassessmentlimitations/deficits:66835}    Pain end of session:  ***    Plan:     {BASPLAN:43556}    Assessment of current progress against goals:  {BASPTNOTEGOALASSESSMENT:10604}    Goals:  Active       PT Problem - LBP       PT Goal 1 - STG       Start:  01/13/25    Expected End:  02/27/25            PT Goal 2 LT FUNCTIONAL GOALS       Start:  01/13/25    Expected End:  " 04/13/25       1.  Improve LE AROM SLR to WFL  2.  Improve bilateral hamstring length to WFL  3.  Improve trunk strength to Good- or better  4.  Pain:  1-2  5.  Functional Outcome Measure:  4              Patient Stated Goal 1       Start:  01/13/25    Expected End:  04/13/25       Able to work out with free weights (home workout)  Pain free

## 2025-01-31 ENCOUNTER — APPOINTMENT (OUTPATIENT)
Dept: PHYSICAL THERAPY | Facility: CLINIC | Age: 45
End: 2025-01-31
Payer: COMMERCIAL

## 2025-01-31 NOTE — PROGRESS NOTES
"    Physical Therapy Treatment    Patient Name: Arlene Bull  MRN: [id number]  Encounter date:  2/3/2025  Time Calculation  Start Time: 1502  Stop Time: 1545  Time Calculation (min): 43 min     PT Therapeutic Procedures Time Entry  Manual Therapy Time Entry: 20  Therapeutic Exercise Time Entry: 20    Visit Number:  3 (including evaluation)  Planned total visits: 10  Visit Authorized/insurance coverage:  01/10/2025: NO AUTH,4000 DED, 70/30 COVERAGE, 60V PT, 6500 OOP, [employer]   Progress Report due visit #10    Current Problem  Problem List Items Addressed This Visit    None  Visit Diagnoses         Codes    Acute midline low back pain with left-sided sciatica     M54.42           Precautions  Precautions  Precautions Comment: none      Pain  Pain Assessment: 0-10  0-10 (Numeric) Pain Score: 5 - Moderate pain  Response to Interventions: No change in pain (pt did not quantify but states it felt good)    Subjective  General  Reason for Referral: Back across and  ocassional/rarely down the left leg  General Comment: Hurts on and off but not bad    PT  Visit  Response to Previous Treatment: Patient with no complaints from previous session.    Objective  Tight and tenderness along the quadratus lumborum on the right    Treatment:  Therapeutic Exercise  Therapeutic Exercise Performed: Yes  Scifit, random, 6min, L2  H/L Ab bracing 5\" x15x  H/L Hip Adduction with rainbow ball 5\" x15  H/L hip adduction rainbow ball mini bridge 5s hold 15x  H/L Hip Abduction with yellow hip core 5s hold 15x then with mini bridge 2-3s hold 15x  RSB lx dempression, 60s  RSB LTR x20  Doorway Stretch 3 x15\" DNP     DNP  Supine piriformis figure 4 stretch 2 x30\"  Seated HS stretch 2 x30\"   MoFlex 2 x30\"      Current HEP:  H/L Ab bracing 5\" x15  H/L Hip Adduction with rainbow ball 5\" x20  H/L Hip Abduction with OTB   LTR x20  Doorway Stretch 3 x15\"     Supine piriformis figure 4 stretch 2 x30\"  Seated HS stretch 2 x30\"   MoFlex 2 x30\"     Has patient been " compliant with HEP?  Yes    Billed Treatment Times:  Therapeutic Exercise 20 min       Manual:    MFR/STM across low lumbar into quadratus lumborum and into the upper gluteals    Billed Treatment Times:  Manual Therapy  20 min    OP EDUCATION:  Outpatient Education  Individual(s) Educated: Patient  Education Provided: Anatomy, Physiology    Assessment:  PT Assessment  Assessment Comment: Progressed to higher level core strengtthening  Continue with higher level activities as tolerated  Areas of improvements:  Improved stability and Improved strength  Limitations/deficits:  Weakness, mm tightness and pain    Plan:     Continue with current POC/no changes    Assessment of current progress against goals:  Progressing toward functional goals    Goals:  Active       PT Problem - LBP       PT Goal 1 - STG       Start:  01/13/25    Expected End:  02/27/25            PT Goal 2 LT FUNCTIONAL GOALS       Start:  01/13/25    Expected End:  04/13/25       1.  Improve LE AROM SLR to WFL  2.  Improve bilateral hamstring length to WFL  3.  Improve trunk strength to Good- or better  4.  Pain:  1-2  5.  Functional Outcome Measure:  4              Patient Stated Goal 1       Start:  01/13/25    Expected End:  04/13/25       Able to work out with free weights (home workout)  Pain free

## 2025-02-03 ENCOUNTER — TREATMENT (OUTPATIENT)
Dept: PHYSICAL THERAPY | Facility: CLINIC | Age: 45
End: 2025-02-03
Payer: COMMERCIAL

## 2025-02-03 DIAGNOSIS — M54.42 ACUTE MIDLINE LOW BACK PAIN WITH LEFT-SIDED SCIATICA: ICD-10-CM

## 2025-02-03 PROCEDURE — 97140 MANUAL THERAPY 1/> REGIONS: CPT | Mod: GP | Performed by: PHYSICAL THERAPIST

## 2025-02-03 PROCEDURE — 97110 THERAPEUTIC EXERCISES: CPT | Mod: GP | Performed by: PHYSICAL THERAPIST

## 2025-02-03 ASSESSMENT — PAIN SCALES - GENERAL: PAINLEVEL_OUTOF10: 5 - MODERATE PAIN

## 2025-02-03 ASSESSMENT — PAIN - FUNCTIONAL ASSESSMENT: PAIN_FUNCTIONAL_ASSESSMENT: 0-10

## 2025-02-04 NOTE — PROGRESS NOTES
DHARA MARSH  69y  Female  [id number]      Patient is a 69y old  Female who presents with a chief complaint of Brain mass (24 Jun 2019 19:32)    HPI:  68 y/o F with a PMH of Carpal tunnel syndrome, former smoker quit 7-8 years ago, and wrist fracture repair in R hand; as per patient she was in her usual state of health she was feeling like she is walking wobbly and felt like about to fall down and that feeling was worsening, she recently went to [place] on a cruise from 6/9/2019-6/, on the second day of the cruise she felt some motion sickness but attributed this to the cruise, States she has been on other cruises without symptoms, few years ago [facility]    Cruise but not recently, she has no fever, chills, headaches, problem speaking, blurry or double vision, she had no nausea, vomiting, she did not fall down, she has no weakness or numbness of any specific area of the body. (24 Jun 2019 19:32)    PAST MEDICAL & SURGICAL HISTORY:  No pertinent past medical history  Carpal tunnel syndrome    FAMILY HISTORY:  No pertinent family history in first degree relatives    REVIEW OF SYSTEMS      General:  See HPI  Weakness.  Wobbliness	    Skin/Breast:  	  Ophthalmologic:  	  ENMT:	    Respiratory and Thorax:  	  Cardiovascular:	    Gastrointestinal:	    Genitourinary:	    Musculoskeletal:	    Neurological:	    Psychiatric:	    Hematology/Lymphatics:	    Endocrine:	    Allergic/Immunologic:	    PHYSICAL EXAM:      Constitutional:    Eyes:    ENMT:    Neck:    Breasts:    Back:    Respiratory:    Cardiovascular:    Gastrointestinal:    Genitourinary:    Rectal:    Extremities:    Vascular:    Neurological:    Skin:    Lymph Nodes:    Musculoskeletal:    Psychiatric:        CBC Full  -  ( 24 Jun 2019 09:59 )  WBC Count : 11.9 K/uL  RBC Count : 4.73 M/uL  Hemoglobin : 14.9 g/dL  Hematocrit : 44.6 %  Platelet Count - Automated : 339 K/uL  Mean Cell Volume : 94.3 fl  Mean Cell Hemoglobin : 31.5 pg  Mean Cell Hemoglobin Concentration : 33.4 g/dL  Auto Neutrophil # : 8.7 K/uL  Auto Lymphocyte # : 1.8 K/uL  Auto Monocyte # : 1.2 K/uL  Auto Eosinophil # : 0.1 K/uL  Auto Basophil # : 0.0 K/uL  Auto Neutrophil % : 73.5 %  Auto Lymphocyte % : 15.6 %  Auto Monocyte % : 9.8 %  Auto Eosinophil % : 0.6 %  Auto Basophil % : 0.2 %    PT/INR - ( 24 Jun 2019 09:59 )   PT: 11.9 sec;   INR: 1.03 ratio         PTT - ( 24 Jun 2019 09:59 )  PTT:28.4 sec  24 Jun 2019 09:59    143    |  106    |  16.0   ----------------------------<  110    4.1     |  24.0   |  0.92     Ca    9.5        24 Jun 2019 09:59    TPro  8.0    /  Alb  4.1    /  TBili  0.4    /  DBili  x      /  AST  23     /  ALT  18     /  AlkPhos  76     24 Jun 2019 09:59        < from: CT Chest w/ IV Cont (06.24.19 @ 18:53) >     EXAM:  CT CHEST IC                          PROCEDURE DATE:  06/24/2019          INTERPRETATION:  CT of the chest, abdomen and pelvis.   COMPARISON: None  CLINICAL INFORMATION: Brain mass. Assess for metastasis or primary   carcinoma.  PROCEDURE:   100 ml of Omnipaque was injected intravenously. None was discarded  2.5 mm axial slice thickness images were obtained. Coronal and sagittal   reformats were also obtained.     FINDINGS:  There is a large RIGHT lower lobe lobulated heterogeneous hypervascular   mass extending from the hilum to the posterior sulcus of RIGHT lower lobe   measuring 9.3 x 4.0 x 7.8 cm and concerning for adenocarcinoma of the   RIGHT lower lobe. The mass extends extends into the RIGHT hilum encasing   the RIGHT lower lobe pulmonary arteries and occluding the RIGHT lower   lobe bronchus. Peripheral basilar satellite lesions noted adjacent to the   main mass.  RIGHT upper lobe, and LEFT lung parenchyma remain clear.  Cardiac chambers remain normal in size without pericardial effusion. No   bulky mediastinal adenopathy.    No associated pleural effusion seen. No evidence of pulmonary embolus.    There is no free intra-abdominal air or ascites.   The gallbladder is distended ptotic without signs of acute cholecystitis.   Common bile duct measures 5.7 mm. No intrahepatic biliary duct dilatation   seen.  The liver, spleen, pancreas, adrenal glands, are normal.    The stomach, duodenum, small and large bowel and appendix are within   normal limits.    Both kidneys show normal uptake of contrast media without masses or   hydronephrosis.      The urinary bladder shows normal morphology and contour.      Uterus and adnexa unremarkable.      There are no retroperitoneal masses or abnormal lymphadenopathy.  The retroperitoneal vessels show atherosclerotic calcified plaques   throughout  nondilated abdominal aorta and iliac arteries. Focal   infrarenal abdominal aortic aneurysm measures 2.6 cm. The bones and soft   tissues are intact.    IMPRESSION:     LARGE RIGHT LOWER LOBE LOBULATED PARTIALLY NECROTIC HYPOVASCULAR MASS   EXTENDING FROM THE SULCUS TO THE HILUM CONCERNING FOR ADENOCARCINOMA OF   THE LUNG. LEFT lung clear. Abdominal pelvic viscera aside from ptotic   gallbladder unremarkable.    < end of copied text >  < from: MR Head w/ IV Cont (06.24.19 @ 14:09) >     EXAM:  MR BRAIN IC                          PROCEDURE DATE:  06/24/2019          INTERPRETATION:  CLINICAL INFORMATION: mass vs infection. . ADMDIAG1:   G93.9 DISORDER OF BRAIN, UNSPECIFIED/.    TECHNIQUE: Multiplanar, multisequence brain MRI was performed without   intravenous contrast    COMPARISON: CT head 6/24/2019.    FINDINGS:    Some images are degraded by motion.    Redemonstration of a cystic/necrotic mass with enhancing nodularity in   the right posterior frontal and parietal region measuring 4.8 x 3.8 x 3.2   cm. The enhancing nodular component along the medial aspect of the mass   involves the splenium of the corpus callosum and crosses the midline.   There is extensive T2/FLAIR hyperintensity surrounding this mass   suggestingvasogenic edema and/or nonenhancing tumor. There are foci of   susceptibility artifact in the lesion compatible with blood products. The   cystic component of the mass contains layering hemorrhage. The solid   enhancing component of this lesion demonstrates restricted diffusion   suggesting hypercellularity.  Two smaller enhancing lesions are noted,   one in the high right frontal lobe measuring 0.8 x 0.7 x 0.9 cm (10:26)   and the other in the left occipital lobe measuring 0.5 x 0.4 x 0.4 cm   (10:13).     There is cerebral volume loss.  There is no evidence of acute infarct.   Scattered foci of T2/FLAIR hyperintensity in the bilateral   periventricular white matter are nonspecific but likely related to   chronic white matter microvascular ischemic disease.    Flow voids of the major intracranial vessels at the skull base follow   expected course and contour.    Mild bilateral maxillary sinus mucosal thickening. Mild bilateral mastoid   mucosal thickening. Bilateral orbits are within normal limits.    IMPRESSION:   A cystic/necrotic, hemorrhagic mass with enhancing nodularity in the   right posterior frontal and parietal region, with involvement of the   splenium of the corpus callosum and crossing the midline. Extensive   T2/FLAIR hyperintensity surrounding this lesion suggesting vasogenic   edema and/or nonenhancing tumor. Two smaller enhancing lesions in the   right frontal lobe and left occipital lobe. Findings suggest primary   malignancy such as GBM versus metastatic disease.    < end of copied text > [facility]  Arlene Bull female   1980 44 y.o.  [id number]      Chief Complaint  Follow up high risk evalaution.    History Of Present Illness  Arlene Bull is a pleasant 44 y.o.  female seen in the breast center for high risk evaluation. She denies breast surgery. She has a history of a benign left core biopsy. She has family history of breast cancer, see below.    BREAST IMAGIN/15/2025 Full breast MRI, indicates BI-RADS Category 3. Stable probably benign areas of non mass enhancement bilaterally. Continued short-term MRI follow-up in 6 months is recommended to ensure stability.      REPRODUCTIVE HISTORY: menarche age 14, , first birth age 29, did not breastfeed, no OCP's, premenopausal, LMP 2025, heterogeneously dense                                FAMILY CANCER HISTORY:   Mother: Breast cancer, 50's (s/p bilateral mastectomy)  Maternal Grandmother: Breast cancer, 50's (s/p bilateral mastectomy)      Review of Systems  Constitutional:  Negative for appetite change, fatigue, fever and unexpected weight change.   HENT:  Negative for ear pain, hearing loss, nosebleeds, sore throat and trouble swallowing.    Eyes:  Negative for discharge, itching and visual disturbance.   Breast: As stated in HPI.  Respiratory:  Negative for cough, chest tightness and shortness of breath.    Cardiovascular:  Negative for chest pain, palpitations and leg swelling.   Gastrointestinal:  Negative for abdominal pain, constipation, diarrhea and nausea.   Endocrine: Negative for cold intolerance and heat intolerance.   Genitourinary:  Negative for dysuria, frequency, hematuria, pelvic pain and vaginal bleeding.   Musculoskeletal:  Negative for arthralgias, back pain, gait problem, joint swelling and myalgias.   Skin:  Negative for color change and rash.   Allergic/Immunologic: Negative for environmental allergies and food allergies.   Neurological:  Negative for dizziness, tremors, speech  difficulty, weakness, numbness and headaches.   Hematological:  Does not bruise/bleed easily.   Psychiatric/Behavioral:  Negative for agitation, dysphoric mood and sleep disturbance. The patient is not nervous/anxious.       Past Medical History  She has a past medical history of Pain in unspecified finger(s) (10/08/2014) and Unspecified sprain of unspecified thumb, initial encounter (10/08/2014).    Surgical History  She has no past surgical history on file.    Family History  Cancer-related family history includes Breast cancer (age of onset: 50) in her maternal grandmother and mother.     Social History  She reports that she has never smoked. She has never been exposed to tobacco smoke. She has never used smokeless tobacco. She reports current alcohol use of about 1.0 - 2.0 standard drink of alcohol per week. She reports that she does not use drugs.    Allergies  Patient has no known allergies.    Medications  Current Outpatient Medications   Medication Instructions    tiZANidine (ZANAFLEX) 2 mg, oral, Every 6 hours PRN       Last Recorded Vitals  Vitals:    02/05/25 1338   BP: 111/74   Pulse: 81   Temp: 36.1 °C (97 °F)   SpO2: 100%         Physical Exam  Patient is alert and oriented x3 and in a relaxed and appropriate mood. Her gait is steady and hand grasps are equal. Sclera is clear. The breasts are nearly symmetrical. The tissue is dense throughout especially superior lateral quadrants without palpable abnormalities, discrete nodules or masses. The skin and nipples appear normal. There is no cervical, supraclavicular or axillary lymphadenopathy.       Relevant Results and Imaging  Study Result    Narrative & Impression   Interpreted By:  Whitney Siegel,   STUDY:  BI MR BREAST BILATERAL WITH CONTRAST FULL PROTOCOL;  1/15/2025 9:08 am      ACCESSION NUMBER(S):  [id number]      ORDERING CLINICIAN:  KRISS SANDERS      INDICATION:  Short-term follow-up following benign left breast biopsy. The patient  was  noted to have bilateral indeterminate findings on the MRI dated  07/17/2024, however following benign biopsy of the left breast, the  remaining findings were felt to be of probably benign etiology.  Family history of breast cancer. Elevated lifetime risk through      ,R92.8 Other abnormal and inconclusive findings on diagnostic imaging  of breast      COMPARISON:  MRI 07/17/2024. Mammogram 02/08/2024.      TECHNIQUE:  Using a dedicated breast coil, STIR axial and T1-weighted fat  saturation axial images of the breasts were obtained, the latter both  before and after intravenous administration of Gadolinium DTPA. On an  independent workstation, 3-D images were formulated using [employer]  including time enhancement curves, subtraction images and MIP images.      Intravenous contrast: 11 ML of Dotarem      FINDINGS:  Density: Heterogeneous fibroglandular tissue.      There is symmetric mild bilateral background enhancement.      RIGHT BREAST:  There is stable appearance of the previously described  focal heterogenous non-mass enhancement in the slightly superior  subareolar breast (subtraction image 86/160). Additionally there is  stable focal non-mass enhancement in the deep central breast  measuring 1.0 cm (subtraction image 86/160). No new suspicious mass  or nonmass enhancement is identified.      No axillary or internal mammary lymphadenopathy is appreciated.      LEFT BREAST:  There is also similar appearance of the previously  noted multifocal clumped non mass enhancement in the upper outer  breast which spans approximately 3.2 cm (subtraction image  58-67/160). Signal void is seen adjacent to the area of clumped non  mass enhancement, corresponding to the site of benign biopsy. No new  suspicious mass or nonmass enhancement is identified.      No axillary or internal mammary lymphadenopathy is appreciated.      NON-BREAST FINDINGS:  None.      IMPRESSION:  Stable probably benign areas of non mass enhancement  bilaterally.  Continued short-term MRI follow-up in 6 months is recommended to  ensure stability.      BI-RADS CATEGORY:  BI-RADS Category:  3 Probably Benign.  Recommendation:  Short Interval Follow-up.  Recommended Date:  6 Months.  Laterality:  Bilateral.      For any future breast imaging appointments, please call [phone]  ([phone]).          MACRO:  None      Signed by: Whitney Siegel 1/16/2025 11:35 AM  Dictation workstation:   [id number]     I explained the results in depth, along with suggested explanation for follow up recommendations based on the testing results. BI-RADS Category 3      Risk Models      Visit Diagnosis  1. Breast cancer screening, high risk patient  BI mammo bilateral screening tomosynthesis    Clinic Appointment Request Follow Up    BI mammo bilateral screening tomosynthesis      2. Abnormal finding on breast imaging        3. Heterogeneously dense tissue of both breasts on mammography            Assessment  Stable left breast non mass enhancement, benign left core biopsy, family history of breast cancer, heterogeneously dense      Plan  Bilateral screening mammogram now. Proceed with full breast MRI July 2025. Return to the office February 2026 for bilateral screening mammogram and office visit. Endocrine therapy not strongly recommended at this time.    Patient Discussion/Summary  Your breast imaging was a BI-RADS category 3. This means the findings on imaging are probably benign, but still require short term follow up to monitor stability. The chances of the findings in this category being a cancer are extremely low, less than 1-2%. You will likely need repeat follow up imaging in 6-12 month intervals for up to 2 years until the findings are known to be stable or resolve. This eliminates unnecessary biopsies and allows for early diagnosis should the area change over time.    Your clinical exam was stable. Please return in 5 months for follow up imaging, or sooner if you have any  problems or concerns.      High risk breast surveillance care plan:  Yearly mammogram with digital breast tomosynthesis  Twice yearly clinical breast examinations  Breast MRI (to schedule call [phone])  Monthly self breast examinations &/or regular self breast awareness  Vitamin D3 2000 IU/daily (over the counter) unless your PCP recommends you take a specific dose  Exercise 3-4 times per week for 45-60 minutes  Limit alcohol to 3-4 drinks per week if you are menopausal  Eat healthy low-fat diet with lots of vegetable & fruits    IMPORTANT INFORMATION REGARDING YOUR RESULTS    If you receive medical information from My [facility] Personal Health Record (online chart) your results will be released into your chart. This means you may view or see results of your biopsy or procedure before I contact you directly. If this occurs, please call the office and we will discuss your results over the phone.    You can see your health information, review clinical summaries from office visits & test results online when you follow your health with MY  Chart, a personal health record. To sign up go to [email or web address]. If you need assistance with signing up or trouble getting into your account call [facility] Patient Line 24/7 at [phone].    My office phone number is [phone]  if you need to get in touch with me or have additional questions or concerns. Thank you for choosing [facility] and trusting me as your healthcare provider. I look forward to seeing you again at your next office visit. I am honored to be a provider on your health care team and I remain dedicated to helping you achieve your health goals.       Tuyet Hernández, ANNE-CNP

## 2025-02-04 NOTE — PROGRESS NOTES
"    Physical Therapy Treatment    Patient Name: Arlene Bull  MRN: [id number]  Encounter date:  2/7/2025  Time Calculation  Start Time: 1545  Stop Time: 1628  Time Calculation (min): 43 min     PT Therapeutic Procedures Time Entry  Therapeutic Exercise Time Entry: 42    Visit Number:  4 (including evaluation)  Planned total visits: 10  Visit Authorized/insurance coverage:  01/10/2025: NO AUTH,4000 DED, 70/30 COVERAGE, 60V PT, 6500 OOP, [employer]   Progress Report due visit #10    Current Problem  Problem List Items Addressed This Visit    None  Visit Diagnoses         Codes    Acute midline low back pain with left-sided sciatica     M54.42           Precautions  Precautions  Precautions Comment: none      Pain  Pain Assessment: 0-10  0-10 (Numeric) Pain Score: 2  Response to Interventions: No change in pain    Subjective  General  General Comment: doing well    PT  Visit  Response to Previous Treatment: Patient with no complaints from previous session.    Objective  Improved core control.  Able to advance DLS    Treatment:  Therapeutic Exercise  Therapeutic Exercise Performed: Yes  DLS and consider manual as needed:    Scifit, random, 6min, L2    Moflex 30s    Flexibility:  3x30s    H/L Ab bracing 5\" x15x    S/L clams 2x10 R/L    Dead bug progression alternate arms, alternate legs, alternate arms legs with 1.5# on ankles and 2# in hand    Heel walk, 1.5# ankle wts, 10x    H/L hip adduction rainbow ball mini bridge 5s hold 15x    Posterior sling stretch 10x, 5s hold      DNP  H/L Hip Adduction with rainbow ball 5\" x15  H/L Hip Abduction with yellow hip core 5s hold 15x then with mini bridge 2-3s hold 15x  RSB lx dempression, 60s  RSB LTR x20  Doorway Stretch 3 x15\" DNP     Supine piriformis figure 4 stretch 2 x30\"  Seated HS stretch 2 x30\"   MoFlex 2 x30\"      Current HEP:  H/L Ab bracing 5\" x15  H/L Hip Adduction with rainbow ball 5\" x20  H/L Hip Abduction with OTB   LTR x20  Doorway Stretch 3 x15\"     Supine piriformis " "figure 4 stretch 2 x30\"  Seated HS stretch 2 x30\"   MoFlex 2 x30\"   Dead bug  Posterior sling stretch    Has patient been compliant with HEP?  Yes    Billed Treatment Times:  Therapeutic Exercise 42 min    OP EDUCATION:  Outpatient Education  Individual(s) Educated: Patient  Education Provided: Home Exercise Program  Education Comment: new exercises    Assessment:  PT Assessment  Assessment Comment: Able to progress DLS with added resistance and dynamic  Areas of improvements:  Decreased pain, Improved stability, and Improved strength  Limitations/deficits:  Weakness and low level pain    Plan:     Continue with current POC/no changes    Assessment of current progress against goals:  Progressing toward functional goals    Goals:  Active       PT Problem - LBP       PT Goal 1 - STG       Start:  01/13/25    Expected End:  02/27/25            PT Goal 2 LT FUNCTIONAL GOALS       Start:  01/13/25    Expected End:  04/13/25       1.  Improve LE AROM SLR to WFL  2.  Improve bilateral hamstring length to WFL  3.  Improve trunk strength to Good- or better  4.  Pain:  1-2  5.  Functional Outcome Measure:  4              Patient Stated Goal 1       Start:  01/13/25    Expected End:  04/13/25       Able to work out with free weights (home workout)  Pain free                       "

## 2025-02-05 ENCOUNTER — OFFICE VISIT (OUTPATIENT)
Dept: SURGICAL ONCOLOGY | Facility: CLINIC | Age: 45
End: 2025-02-05
Payer: COMMERCIAL

## 2025-02-05 VITALS
BODY MASS INDEX: 20.08 KG/M2 | SYSTOLIC BLOOD PRESSURE: 111 MMHG | OXYGEN SATURATION: 100 % | TEMPERATURE: 97 F | WEIGHT: 122 LBS | DIASTOLIC BLOOD PRESSURE: 74 MMHG | HEART RATE: 81 BPM

## 2025-02-05 DIAGNOSIS — R92.8 ABNORMAL FINDING ON BREAST IMAGING: ICD-10-CM

## 2025-02-05 DIAGNOSIS — R92.333 HETEROGENEOUSLY DENSE TISSUE OF BOTH BREASTS ON MAMMOGRAPHY: ICD-10-CM

## 2025-02-05 DIAGNOSIS — Z12.39 BREAST CANCER SCREENING, HIGH RISK PATIENT: Primary | ICD-10-CM

## 2025-02-05 PROCEDURE — 99214 OFFICE O/P EST MOD 30 MIN: CPT | Performed by: NURSE PRACTITIONER

## 2025-02-05 PROCEDURE — 1036F TOBACCO NON-USER: CPT | Performed by: NURSE PRACTITIONER

## 2025-02-05 ASSESSMENT — PATIENT HEALTH QUESTIONNAIRE - PHQ9
SUM OF ALL RESPONSES TO PHQ9 QUESTIONS 1 & 2: 0
2. FEELING DOWN, DEPRESSED OR HOPELESS: NOT AT ALL
1. LITTLE INTEREST OR PLEASURE IN DOING THINGS: NOT AT ALL

## 2025-02-05 ASSESSMENT — PAIN SCALES - GENERAL: PAINLEVEL_OUTOF10: 0-NO PAIN

## 2025-02-07 ENCOUNTER — TREATMENT (OUTPATIENT)
Dept: PHYSICAL THERAPY | Facility: CLINIC | Age: 45
End: 2025-02-07
Payer: COMMERCIAL

## 2025-02-07 DIAGNOSIS — M54.42 ACUTE MIDLINE LOW BACK PAIN WITH LEFT-SIDED SCIATICA: ICD-10-CM

## 2025-02-07 PROCEDURE — 97110 THERAPEUTIC EXERCISES: CPT | Mod: GP | Performed by: PHYSICAL THERAPIST

## 2025-02-07 ASSESSMENT — PAIN - FUNCTIONAL ASSESSMENT: PAIN_FUNCTIONAL_ASSESSMENT: 0-10

## 2025-02-07 ASSESSMENT — PAIN SCALES - GENERAL: PAINLEVEL_OUTOF10: 2

## 2025-02-10 ENCOUNTER — TREATMENT (OUTPATIENT)
Dept: PHYSICAL THERAPY | Facility: CLINIC | Age: 45
End: 2025-02-10
Payer: COMMERCIAL

## 2025-02-10 DIAGNOSIS — M54.42 ACUTE MIDLINE LOW BACK PAIN WITH LEFT-SIDED SCIATICA: ICD-10-CM

## 2025-02-10 PROCEDURE — 97110 THERAPEUTIC EXERCISES: CPT | Mod: GP | Performed by: PHYSICAL THERAPIST

## 2025-02-10 ASSESSMENT — PAIN SCALES - GENERAL: PAINLEVEL_OUTOF10: 2

## 2025-02-10 ASSESSMENT — PAIN - FUNCTIONAL ASSESSMENT: PAIN_FUNCTIONAL_ASSESSMENT: 0-10

## 2025-02-10 NOTE — PROGRESS NOTES
"    Physical Therapy Treatment    Patient Name: Arlene Bull  MRN: [id number]  Encounter date:  2/12/2025             Visit Number:  6 (including evaluation)  Planned total visits: 10  Visit Authorized/insurance coverage:  01/10/2025: NO AUTH,4000 DED, 70/30 COVERAGE, 60V PT, 6500 OOP, [facility]   Progress Report due visit #10    Current Problem  Problem List Items Addressed This Visit    None     Precautions         Pain       Subjective  General            Objective  ***    Treatment:  {PT Treatments:09334}  DLS and consider manual as needed:     Scifit, random, 6min, L2     Moflex 30s     Flexibility:  3x30s     H/L Ab bracing 5\" x15x     S/L clams 2x10 R/L     Dead bug 1.5# on ankles and 2# in hand     Heel walk, 1.5# ankle wts, 10x     H/L hip adduction rainbow ball mini bridge 5s hold 15x     Posterior sling stretch 10x, 5s hold     Cat/Cow 5s hold 10x        Current HEP:  H/L Ab bracing 5\" x15  H/L Hip Adduction with rainbow ball 5\" x20  H/L Hip Abduction with OTB   LTR x20  Doorway Stretch 3 x15\"     Supine piriformis figure 4 stretch 2 x30\"  Seated HS stretch 2 x30\"   MoFlex 2 x30\"   Dead bug  Posterior sling stretch  Access Code: [email or web address]  URL: [email or web address]  Date: 02/10/2025  Prepared by: Emmy Siddiqi     Exercises  - Cat Cow  - 1 x daily - 3-5 x weekly - 1-2 sets - 10-15 reps  - Bird Dog  - 1 x daily - 3-5 x weekly - 1-2 sets - 10-15 reps    Current HEP:  ***    Has patient been compliant with HEP?  {YES/NO:34908}    Billed Treatment Times:  {Treatment times:44157}    {PT Treatments:64111}  Manual:    ***  Billed Treatment Times:  {Treatment times:28550}      {PT Treatments:61315}  Balance/NMRE:     ***  Billed Treatment Times:  {Treatment times:80399}    {PT Treatments:89711}  Therapeutic Activity:  ***  Billed Treatment Times:  {Treatment times:02874}    OP EDUCATION:       Assessment:     Areas of improvements:  {basassessmentimprovements:39093}  Limitations/deficits:  " {basassessmentlimitations/deficits:08304}    Pain end of session:  ***    Plan:     {BASPLAN:29846}    Assessment of current progress against goals:  {BASPTNOTEGOALASSESSMENT:95677}    Goals:  Active       PT Problem - LBP       PT Goal 1 - STG       Start:  01/13/25    Expected End:  02/27/25            PT Goal 2 LT FUNCTIONAL GOALS       Start:  01/13/25    Expected End:  04/13/25       1.  Improve LE AROM SLR to WFL  2.  Improve bilateral hamstring length to WFL  3.  Improve trunk strength to Good- or better  4.  Pain:  1-2  5.  Functional Outcome Measure:  4              Patient Stated Goal 1       Start:  01/13/25    Expected End:  04/13/25       Able to work out with free weights (home workout)  Pain free

## 2025-02-10 NOTE — PROGRESS NOTES
"    Physical Therapy Treatment    Patient Name: Arlene Bull  MRN: [id number]  Encounter date:  2/10/2025  Time Calculation  Start Time: 1432  Stop Time: 1513  Time Calculation (min): 41 min     PT Therapeutic Procedures Time Entry  Therapeutic Exercise Time Entry: 40    Visit Number:  5 (including evaluation)  Planned total visits: 10  Visit Authorized/insurance coverage:  01/10/2025: NO AUTH,4000 DED, 70/30 COVERAGE, 60V PT, 6500 OOP, [employer]   Progress Report due visit #10    Current Problem  Problem List Items Addressed This Visit    None  Visit Diagnoses         Codes    Acute midline low back pain with left-sided sciatica     M54.42           Precautions  Precautions  Precautions Comment: none      Pain  Pain Assessment: 0-10  0-10 (Numeric) Pain Score: 2  Pain Type: Chronic pain  Response to Interventions: No change in pain    Subjective  General  General Comment: Feeling gooid    PT  Visit  Response to Previous Treatment: Patient with no complaints from previous session.    Objective  Good core control in quadruped    Treatment:  Therapeutic Exercise  Therapeutic Exercise Performed: Yes  DLS and consider manual as needed:     Scifit, random, 6min, L2     Moflex 30s     Flexibility:  3x30s     H/L Ab bracing 5\" x15x     S/L clams 2x10 R/L     Dead bug 1.5# on ankles and 2# in hand     Heel walk, 1.5# ankle wts, 10x     H/L hip adduction rainbow ball mini bridge 5s hold 15x     Posterior sling stretch 10x, 5s hold    Cat/Cow 5s hold 10x       Current HEP:  H/L Ab bracing 5\" x15  H/L Hip Adduction with rainbow ball 5\" x20  H/L Hip Abduction with OTB   LTR x20  Doorway Stretch 3 x15\"     Supine piriformis figure 4 stretch 2 x30\"  Seated HS stretch 2 x30\"   MoFlex 2 x30\"   Dead bug  Posterior sling stretch  Access Code: [email or web address]  URL: [email or web address]  Date: 02/10/2025  Prepared by: Emmy Siddiqi    Exercises  - Cat Cow  - 1 x daily - 3-5 x weekly - 1-2 sets - 10-15 reps  - Bird Dog  - 1 x daily - 3-5 x " weekly - 1-2 sets - 10-15 reps      Has patient been compliant with HEP?  Yes    Billed Treatment Times:  Therapeutic Exercise 40 min    OP EDUCATION:       Assessment:  PT Assessment  Assessment Comment: Pt progressing through core exercises program demonstrated improved control.  Areas of improvements:  Decreased pain, Improved stability, and Improved strength  Limitations/deficits:  Weakness and low level pain    Plan:     Continue with current POC/no changes    Assessment of current progress against goals:  Progressing toward functional goals    Goals:  Active       PT Problem - LBP       PT Goal 1 - STG       Start:  01/13/25    Expected End:  02/27/25            PT Goal 2 LT FUNCTIONAL GOALS       Start:  01/13/25    Expected End:  04/13/25       1.  Improve LE AROM SLR to WFL  2.  Improve bilateral hamstring length to WFL  3.  Improve trunk strength to Good- or better  4.  Pain:  1-2  5.  Functional Outcome Measure:  4              Patient Stated Goal 1       Start:  01/13/25    Expected End:  04/13/25       Able to work out with free weights (home workout)  Pain free

## 2025-02-11 ENCOUNTER — HOSPITAL ENCOUNTER (OUTPATIENT)
Dept: RADIOLOGY | Facility: CLINIC | Age: 45
Discharge: HOME | End: 2025-02-11
Payer: COMMERCIAL

## 2025-02-11 VITALS — HEIGHT: 65 IN | WEIGHT: 121.91 LBS | BODY MASS INDEX: 20.31 KG/M2

## 2025-02-11 DIAGNOSIS — Z12.39 BREAST CANCER SCREENING, HIGH RISK PATIENT: ICD-10-CM

## 2025-02-11 PROCEDURE — 77063 BREAST TOMOSYNTHESIS BI: CPT | Performed by: RADIOLOGY

## 2025-02-11 PROCEDURE — 77063 BREAST TOMOSYNTHESIS BI: CPT

## 2025-02-11 PROCEDURE — 77067 SCR MAMMO BI INCL CAD: CPT | Performed by: RADIOLOGY

## 2025-02-12 ENCOUNTER — DOCUMENTATION (OUTPATIENT)
Dept: PHYSICAL THERAPY | Facility: CLINIC | Age: 45
End: 2025-02-12
Payer: COMMERCIAL

## 2025-02-12 ENCOUNTER — APPOINTMENT (OUTPATIENT)
Dept: PHYSICAL THERAPY | Facility: CLINIC | Age: 45
End: 2025-02-12
Payer: COMMERCIAL

## 2025-02-12 NOTE — PROGRESS NOTES
Physical Therapy                 Therapy Communication Note    Patient Name: Arlene Bull  MRN: [id number]  Department:   Room: Room/bed info not found  Today's Date: 2/12/2025     Discipline: Physical Therapy          Missed Visit Reason:      Missed Time: Cancel    Comment:

## 2025-02-13 NOTE — PROGRESS NOTES
"    Physical Therapy Treatment    Patient Name: Arlene Bull  MRN: [id number]  Encounter date:  2/17/2025             Visit Number:  6 (including evaluation)  Planned total visits: 10  Visit Authorized/insurance coverage:  01/10/2025: NO AUTH,4000 DED, 70/30 COVERAGE, 60V PT, 6500 OOP, [facility]   Progress Report due visit #10    Current Problem  Problem List Items Addressed This Visit    None     Precautions         Pain       Subjective  General            Objective  ***    Treatment:  {PT Treatments:62036}  DLS and consider manual as needed:     Scifit, random, 6min, L2     Moflex 30s     Flexibility:  3x30s     H/L Ab bracing 5\" x15x     S/L clams 2x10 R/L     Dead bug 1.5# on ankles and 2# in hand     Heel walk, 1.5# ankle wts, 10x     H/L hip adduction rainbow ball mini bridge 5s hold 15x     Posterior sling stretch 10x, 5s hold     Cat/Cow 5s hold 10x        Current HEP:  H/L Ab bracing 5\" x15  H/L Hip Adduction with rainbow ball 5\" x20  H/L Hip Abduction with OTB   LTR x20  Doorway Stretch 3 x15\"     Supine piriformis figure 4 stretch 2 x30\"  Seated HS stretch 2 x30\"   MoFlex 2 x30\"   Dead bug  Posterior sling stretch  Access Code: [email or web address]  URL: [email or web address]  Date: 02/10/2025  Prepared by: Emmy Siddiqi     Exercises  - Cat Cow  - 1 x daily - 3-5 x weekly - 1-2 sets - 10-15 reps  - Bird Dog  - 1 x daily - 3-5 x weekly - 1-2 sets - 10-15 reps    Current HEP:  ***    Has patient been compliant with HEP?  {YES/NO:62178}    Billed Treatment Times:  {Treatment times:71992}    {PT Treatments:90590}  Manual:    ***  Billed Treatment Times:  {Treatment times:36352}      {PT Treatments:44264}  Balance/NMRE:     ***  Billed Treatment Times:  {Treatment times:18009}    {PT Treatments:17043}  Therapeutic Activity:  ***  Billed Treatment Times:  {Treatment times:06613}    OP EDUCATION:       Assessment:     Areas of improvements:  {basassessmentimprovements:93478}  Limitations/deficits:  " {basassessmentlimitations/deficits:76270}    Pain end of session:  ***    Plan:     {BASPLAN:78134}    Assessment of current progress against goals:  {BASPTNOTEGOALASSESSMENT:15351}    Goals:  Active       PT Problem - LBP       PT Goal 1 - STG       Start:  01/13/25    Expected End:  02/27/25            PT Goal 2 LT FUNCTIONAL GOALS       Start:  01/13/25    Expected End:  04/13/25       1.  Improve LE AROM SLR to WFL  2.  Improve bilateral hamstring length to WFL  3.  Improve trunk strength to Good- or better  4.  Pain:  1-2  5.  Functional Outcome Measure:  4              Patient Stated Goal 1       Start:  01/13/25    Expected End:  04/13/25       Able to work out with free weights (home workout)  Pain free

## 2025-02-17 ENCOUNTER — APPOINTMENT (OUTPATIENT)
Dept: PHYSICAL THERAPY | Facility: CLINIC | Age: 45
End: 2025-02-17
Payer: COMMERCIAL

## 2025-03-20 ENCOUNTER — DOCUMENTATION (OUTPATIENT)
Dept: PHYSICAL THERAPY | Facility: CLINIC | Age: 45
End: 2025-03-20
Payer: COMMERCIAL

## 2025-03-20 NOTE — PROGRESS NOTES
Physical Therapy    Discharge Summary    Name: Arlene Bull  MRN: [id number]  : 1980  Date: 25    Discharge Summary: PT    Discharge Information: Date of discharge 2025    Therapy Summary: Pt last seen on 02/10/2025.  The next two visits were canceled.  At time of last visit past reports she was feeling good with pain rated 2/10.    Discharge Status: Pt instructed in HEP.       Rehab Discharge Reason: Other Pt did not attend further visits

## 2025-04-13 ENCOUNTER — OFFICE VISIT (OUTPATIENT)
Dept: URGENT CARE | Age: 45
End: 2025-04-13
Payer: COMMERCIAL

## 2025-04-13 VITALS
RESPIRATION RATE: 20 BRPM | TEMPERATURE: 98 F | WEIGHT: 120 LBS | SYSTOLIC BLOOD PRESSURE: 131 MMHG | OXYGEN SATURATION: 100 % | HEIGHT: 64 IN | BODY MASS INDEX: 20.49 KG/M2 | HEART RATE: 86 BPM | DIASTOLIC BLOOD PRESSURE: 88 MMHG

## 2025-04-13 DIAGNOSIS — B96.89 BACTERIAL SINUSITIS: Primary | ICD-10-CM

## 2025-04-13 DIAGNOSIS — J32.9 BACTERIAL SINUSITIS: Primary | ICD-10-CM

## 2025-04-13 PROCEDURE — 99070 SPECIAL SUPPLIES PHYS/QHP: CPT | Performed by: SURGERY

## 2025-04-13 PROCEDURE — 99203 OFFICE O/P NEW LOW 30 MIN: CPT | Performed by: SURGERY

## 2025-04-13 PROCEDURE — 1036F TOBACCO NON-USER: CPT | Performed by: SURGERY

## 2025-04-13 PROCEDURE — 3008F BODY MASS INDEX DOCD: CPT | Performed by: SURGERY

## 2025-04-13 RX ORDER — PREDNISONE 20 MG/1
40 TABLET ORAL DAILY
Qty: 10 TABLET | Refills: 0 | Status: SHIPPED | OUTPATIENT
Start: 2025-04-13 | End: 2025-04-13

## 2025-04-13 RX ORDER — AMOXICILLIN 875 MG/1
875 TABLET, FILM COATED ORAL 2 TIMES DAILY
Qty: 20 TABLET | Refills: 0 | Status: SHIPPED | OUTPATIENT
Start: 2025-04-13 | End: 2025-04-23

## 2025-04-13 RX ORDER — PREDNISONE 20 MG/1
40 TABLET ORAL DAILY
Qty: 10 TABLET | Refills: 0 | Status: SHIPPED | OUTPATIENT
Start: 2025-04-13 | End: 2025-04-18

## 2025-04-13 RX ORDER — AMOXICILLIN 875 MG/1
875 TABLET, FILM COATED ORAL 2 TIMES DAILY
Qty: 20 TABLET | Refills: 0 | Status: SHIPPED | OUTPATIENT
Start: 2025-04-13 | End: 2025-04-13

## 2025-04-13 NOTE — PROGRESS NOTES
Chief Complaint   Patient presents with    Cough     Cough, chest/nasal congestion, loss of smell/taste x 8 days.       Physical Exam:     GEN: No acute distress    ENT: Bilateral TMs bulging with serous effusions, canals unremarkable, sinus congestion present. Frontal and maxillary sinus tender to finger tap. Pharynx and tonsils mildly hyperemic but without exudate.     Resp: Lungs clear to auscultation bilaterally           Encounter Diagnosis   Name Primary?    Bacterial sinusitis Yes        Medical Decision Making & Plan:   Amoxicillin   Prednisone    Home      04/13/25 at 6:03 PM - Jaqui Swain, DO

## 2025-07-16 ENCOUNTER — HOSPITAL ENCOUNTER (OUTPATIENT)
Dept: RADIOLOGY | Facility: CLINIC | Age: 45
Discharge: HOME | End: 2025-07-16
Payer: COMMERCIAL

## 2025-07-16 DIAGNOSIS — R92.8 ABNORMAL MRI, BREAST: ICD-10-CM

## 2025-07-16 DIAGNOSIS — R92.8 ABNORMAL MRI, BREAST: Primary | ICD-10-CM

## 2025-07-16 PROCEDURE — A9575 INJ GADOTERATE MEGLUMI 0.1ML: HCPCS | Performed by: NURSE PRACTITIONER

## 2025-07-16 PROCEDURE — 77049 MRI BREAST C-+ W/CAD BI: CPT | Performed by: STUDENT IN AN ORGANIZED HEALTH CARE EDUCATION/TRAINING PROGRAM

## 2025-07-16 PROCEDURE — 2550000001 HC RX 255 CONTRASTS: Performed by: NURSE PRACTITIONER

## 2025-07-16 PROCEDURE — 77049 MRI BREAST C-+ W/CAD BI: CPT

## 2025-07-16 RX ORDER — GADOTERATE MEGLUMINE 376.9 MG/ML
11 INJECTION INTRAVENOUS
Status: COMPLETED | OUTPATIENT
Start: 2025-07-16 | End: 2025-07-16

## 2025-07-16 RX ADMIN — GADOTERATE MEGLUMINE 11 ML: 376.9 INJECTION INTRAVENOUS at 10:31
